# Patient Record
Sex: MALE | Race: WHITE | NOT HISPANIC OR LATINO | Employment: PART TIME | URBAN - METROPOLITAN AREA
[De-identification: names, ages, dates, MRNs, and addresses within clinical notes are randomized per-mention and may not be internally consistent; named-entity substitution may affect disease eponyms.]

---

## 2017-04-05 ENCOUNTER — HOSPITAL ENCOUNTER (EMERGENCY)
Facility: MEDICAL CENTER | Age: 42
End: 2017-04-05
Attending: EMERGENCY MEDICINE

## 2017-04-05 VITALS
RESPIRATION RATE: 18 BRPM | WEIGHT: 130 LBS | OXYGEN SATURATION: 98 % | DIASTOLIC BLOOD PRESSURE: 68 MMHG | SYSTOLIC BLOOD PRESSURE: 115 MMHG | HEART RATE: 88 BPM | BODY MASS INDEX: 19.7 KG/M2 | TEMPERATURE: 98.8 F | HEIGHT: 68 IN

## 2017-04-05 DIAGNOSIS — K02.9 DENTAL CARIES: ICD-10-CM

## 2017-04-05 PROCEDURE — 700102 HCHG RX REV CODE 250 W/ 637 OVERRIDE(OP): Performed by: EMERGENCY MEDICINE

## 2017-04-05 PROCEDURE — 99283 EMERGENCY DEPT VISIT LOW MDM: CPT

## 2017-04-05 PROCEDURE — A9270 NON-COVERED ITEM OR SERVICE: HCPCS | Performed by: EMERGENCY MEDICINE

## 2017-04-05 RX ORDER — AMOXICILLIN 500 MG/1
500 CAPSULE ORAL ONCE
Status: COMPLETED | OUTPATIENT
Start: 2017-04-05 | End: 2017-04-05

## 2017-04-05 RX ORDER — HYDROCODONE BITARTRATE AND ACETAMINOPHEN 5; 325 MG/1; MG/1
2 TABLET ORAL ONCE
Status: COMPLETED | OUTPATIENT
Start: 2017-04-05 | End: 2017-04-05

## 2017-04-05 RX ORDER — AMOXICILLIN 500 MG/1
500 CAPSULE ORAL 3 TIMES DAILY
Qty: 21 CAP | Refills: 0 | Status: SHIPPED | OUTPATIENT
Start: 2017-04-05 | End: 2017-04-12

## 2017-04-05 RX ORDER — HYDROCODONE BITARTRATE AND ACETAMINOPHEN 5; 325 MG/1; MG/1
1-2 TABLET ORAL EVERY 6 HOURS PRN
Qty: 10 TAB | Refills: 0 | Status: SHIPPED | OUTPATIENT
Start: 2017-04-05

## 2017-04-05 RX ADMIN — AMOXICILLIN 500 MG: 500 CAPSULE ORAL at 19:34

## 2017-04-05 RX ADMIN — HYDROCODONE BITARTRATE AND ACETAMINOPHEN 2 TABLET: 5; 325 TABLET ORAL at 19:34

## 2017-04-05 ASSESSMENT — PAIN SCALES - GENERAL: PAINLEVEL_OUTOF10: 8

## 2017-04-05 NOTE — ED AVS SNAPSHOT
Home Care Instructions                                                                                                                Jonathan Hooper   MRN: [id number]    Department:  [facility], Emergency Dept   Date of Visit:  4/5/2017            [facility], Emergency Dept    [address] [place] [id number]    Phone:  [phone]      You were seen by     Gadiel Gaitan M.D.      Your Diagnosis Was     Dental caries     K02.9       These are the medications you received during your hospitalization from 04/05/2017 1636 to 04/05/2017 1950     Date/Time Order Dose Route Action    04/05/2017 1934 hydrocodone-acetaminophen (NORCO) 5-325 MG per tablet 2 Tab 2 Tab Oral Given    04/05/2017 1934 amoxicillin (AMOXIL) capsule 500 mg 500 mg Oral Given      Follow-up Information     1. Follow up with [facility], Emergency Dept.    Specialty:  Emergency Medicine    Why:  If symptoms worsen    Contact information    [address] [place] [id number] [phone]      Medication Information     Review all of your home medications and newly ordered medications with your primary doctor and/or pharmacist as soon as possible. Follow medication instructions as directed by your doctor and/or pharmacist.     Please keep your complete medication list with you and share with your physician. Update the information when medications are discontinued, doses are changed, or new medications (including over-the-counter products) are added; and carry medication information at all times in the event of emergency situations.               Medication List      START taking these medications        Instructions    Morning Afternoon Evening Bedtime    amoxicillin 500 MG Caps   Last time this was given:  500 mg on 4/5/2017  7:34 PM   Commonly known as:  AMOXIL        Take 1 Cap by mouth 3 times a day for 7 days.   Dose:  500 mg                        hydrocodone-acetaminophen 5-325 MG Tabs per tablet   Last time this was given:  2 Tabs on 4/5/2017  7:34 PM   Commonly known as:  NORCO        Take 1-2 Tabs by mouth every 6 hours as needed.   Dose:  1-2 Tab                             Where to Get Your Medications      You can get these medications from any pharmacy     Bring a paper prescription for each of these medications    - amoxicillin 500 MG Caps  - hydrocodone-acetaminophen 5-325 MG Tabs per tablet              Discharge Instructions       Dental Caries  Dental caries is tooth decay. This decay can cause a hole in teeth (cavity) that can get bigger and deeper over time.  HOME CARE  · Brush and floss your teeth. Do this at least two times a day.  · Use a fluoride toothpaste.  · Use a mouth rinse if told by your dentist or doctor.  · Eat less sugary and starchy foods. Drink less sugary drinks.  · Avoid snacking often on sugary and starchy foods. Avoid sipping often on sugary drinks.  · Keep regular checkups and cleanings with your dentist.  · Use fluoride supplements if told by your dentist or doctor.  · Allow fluoride to be applied to teeth if told by your dentist or doctor.     This information is not intended to replace advice given to you by your health care provider. Make sure you discuss any questions you have with your health care provider.     Document Released: 09/26/2009 Document Revised: 01/08/2016 Document Reviewed: 12/20/2013  Elsevier Interactive Patient Education ©2016 [employer]            Patient Information     Patient Information    Following emergency treatment: all patient requiring follow-up care must return either to a private physician or a clinic if your condition worsens before you are able to obtain further medical attention, please return to the emergency room.     Billing Information    At [facility], we work to make the billing process streamlined for our patients.  Our Representatives are here to answer any questions you  may have regarding your hospital bill.  If you have insurance coverage and have supplied your insurance information to us, we will submit a claim to your insurer on your behalf.  Should you have any questions regarding your bill, we can be reached online or by phone as follows:  Online: You are able pay your bills online or live chat with our representatives about any billing questions you may have. We are here to help Monday - Friday from 8:00am to 7:30pm and 9:00am - 12:00pm on Saturdays.  Please visit [email or web address]  for more information.   Phone:  [phone] or [phone]    Please note that your emergency physician, surgeon, pathologist, radiologist, anesthesiologist, and other specialists are not employed by [facility] and will therefore bill separately for their services.  Please contact them directly for any questions concerning their bills at the numbers below:     Emergency Physician Services:  [phone]  [facility]:  [phone]  [facility]:  [phone]  [facility]:  [phone]    1. Your final bill may vary from the amount quoted upon discharge if all procedures are not complete at that time, or if your doctor has additional procedures of which we are not aware. You will receive an additional bill if you return to the Emergency Department at [facility] hospital for suture removal regardless of the facility of which the sutures were placed.     2. Please arrange for settlement of this account at the emergency registration.    3. All self-pay accounts are due in full at the time of treatment.  If you are unable to meet this obligation then payment is expected within 4-5 days.     4. If you have had radiology studies (CT, X-ray, Ultrasound, MRI), you have received a preliminary result during your emergency department visit. Please contact the radiology department [phone] to receive a copy of your final  result. Please discuss the Final result with your primary physician or with the follow up physician provided.     Crisis Hotline:  [place] Crisis Hotline:  3-398-GGOZCKJ or 1-510.513.6582  [place] Crisis Hotline:    1-166.379.8944 or 830-301-1075         ED Discharge Follow Up Questions    1. In order to provide you with very good care, we would like to follow up with a phone call in the next few days.  May we have your permission to contact you?     YES /  NO    2. What is the best phone number to call you? (       )_____-__________    3. What is the best time to call you?      Morning  /  Afternoon  /  Evening                   Patient Signature:  ____________________________________________________________    Date:  ____________________________________________________________

## 2017-04-05 NOTE — ED AVS SNAPSHOT
4/5/2017          Jonathan Hooper  [address]  [place] [address]    Dear Jonathan:    [facility] wants to ensure your discharge home is safe and you or your loved ones have had all your questions answered regarding your care after you leave the hospital.    You may receive a telephone call within two days of your discharge.  This call is to make certain you understand your discharge instructions as well as ensure we provided you with the best care possible during your stay with us.     The call will only last approximately 3-5 minutes and will be done by a nurse.    Once again, we want to ensure your discharge home is safe and that you have a clear understanding of any next steps in your care.  If you have any questions or concerns, please do not hesitate to contact us, we are here for you.  Thank you for choosing [facility] for your healthcare needs.    Sincerely,    Jean Guerin    [facility]

## 2017-04-05 NOTE — ED AVS SNAPSHOT
Chomp Access Code: [email or web address]  Expires: 5/5/2017  7:50 PM    Your email address is not on file at [facility].  Email Addresses are required for you to sign up for Chomp, please contact [phone] to verify your personal information and to provide your email address prior to attempting to register for Chomp.    Jonathan Hooper  [address]  [place] [address]    Chomp  A secure, online tool to manage your health information     [facility]’s Chomp® is a secure, online tool that connects you to your personalized health information from the privacy of your home -- day or night - making it very easy for you to manage your healthcare. Once the activation process is completed, you can even access your medical information using the Chomp gael, which is available for free in the Apple Gael store or Google Play store.     To learn more about Chomp, visit [email or web address]    There are two levels of access available (as shown below):   My Chart Features  [facility] Primary Care Doctor [facility]  Specialists [facility]  Urgent  Care Non-[facility] Primary Care Doctor   Email your healthcare team securely and privately 24/7 X X X    Manage appointments: schedule your next appointment; view details of past/upcoming appointments X      Request prescription refills. X      View recent personal medical records, including lab and immunizations X X X X   View health record, including health history, allergies, medications X X X X   Read reports about your outpatient visits, procedures, consult and ER notes X X X X   See your discharge summary, which is a recap of your hospital and/or ER visit that includes your diagnosis, lab results, and care plan X X  X     How to register for Crew[employer]:  Once your e-mail address has been verified, follow the following steps to sign up for Crewt.     1. Go to  [email or web address]  2. Click on the Sign Up Now box, which takes you to the New Member Sign Up  page. You will need to provide the following information:  a. Enter your [employer] Access Code exactly as it appears at the top of this page. (You will not need to use this code after you’ve completed the sign-up process. If you do not sign up before the expiration date, you must request a new code.)   b. Enter your date of birth.   c. Enter your home email address.   d. Click Submit, and follow the next screen’s instructions.  3. Create a Xenon Arc ID. This will be your [employer] Arc login ID and cannot be changed, so think of one that is secure and easy to remember.  4. Create a [employer] password. You can change your password at any time.  5. Enter your Password Reset Question and Answer. This can be used at a later time if you forget your password.   6. Enter your e-mail address. This allows you to receive e-mail notifications when new information is available in [employer] Arc.  7. Click Sign Up. You can now view your health information.    For assistance activating your [employer] account, call [phone]

## 2017-04-06 NOTE — ED NOTES
Pt given discharge and follow up instructions and prescriptions, all questions answered, instructed not to drive while taking narcotics, pt verbalized understanding. Pt discharged with family. Copy of discharge provided to pt. Signed copy in chart.  Pt states that all personal belongings are in possession.

## 2017-04-06 NOTE — DISCHARGE INSTRUCTIONS
Dental Caries  Dental caries is tooth decay. This decay can cause a hole in teeth (cavity) that can get bigger and deeper over time.  HOME CARE  · Brush and floss your teeth. Do this at least two times a day.  · Use a fluoride toothpaste.  · Use a mouth rinse if told by your dentist or doctor.  · Eat less sugary and starchy foods. Drink less sugary drinks.  · Avoid snacking often on sugary and starchy foods. Avoid sipping often on sugary drinks.  · Keep regular checkups and cleanings with your dentist.  · Use fluoride supplements if told by your dentist or doctor.  · Allow fluoride to be applied to teeth if told by your dentist or doctor.     This information is not intended to replace advice given to you by your health care provider. Make sure you discuss any questions you have with your health care provider.     Document Released: 09/26/2009 Document Revised: 01/08/2016 Document Reviewed: 12/20/2013  [employer] Interactive Patient Education ©2016 Elsevier Inc.

## 2017-04-06 NOTE — ED PROVIDER NOTES
ER Provider Note     Scribed for Gadiel Gaitan, * by Zulema Peters. 4/5/2017, 7:15 PM.    Primary Care Provider: Patient states none   Means of Arrival: Walk-in  History obtained from: Patient  History limited by: None      CHIEF COMPLAINT  Chief Complaint   Patient presents with   • Dental Pain     HPI  Jonathan Hooper is a 41 y.o. male who presents for dental pain located to his left bottom teeth that began two days ago and has gradually worsened since then that is exacerbated with eating. He states that he has an 'abscess' to the area that feels like it burst today. Per patient he has had this pain in the past most recently 1 year ago. The patient reports that he has not seen a dentist for this. He denies any recent fevers or chills.     REVIEW OF SYSTEMS  See HPI for further details.   E    PAST MEDICAL HISTORY  Past Medical History   Diagnosis Date   • Ulcerative colitis (CMS-HCC)    • Subdural hematoma (CMS-[facility])      FAMILY HISTORY  History reviewed. No pertinent family history.    SOCIAL HISTORY  Social History     Social History   • Marital Status: Single     Spouse Name: N/A   • Number of Children: N/A   • Years of Education: N/A     Social History Main Topics   • Smoking status: Current Every Day Smoker -- 1.00 packs/day for 23 years   • Smokeless tobacco: None   • Alcohol Use: Yes      Comment: 5-10drinks 1-2x/wk   • Drug Use: No   • Sexual Activity: Not Asked     Other Topics Concern   • None     Social History Narrative     SURGICAL HISTORY  History reviewed. No pertinent past surgical history.    CURRENT MEDICATIONS  Home Medications     Reviewed by Ester Grier R.N. (Registered Nurse) on 04/05/17 at 1837  Med List Status: Complete    Medication Last Dose Status          Patient Edy Taking any Medications                      ALLERGIES  No Known Allergies    PHYSICAL EXAM  VITAL SIGNS: /79 mmHg  Pulse 103  Temp(Src) 37.1 °C (98.8 °F) (Temporal)  Resp 20  Ht 1.727  "m (5' 7.99\")  Wt 58.968 kg (130 lb)  BMI 19.77 kg/m2  SpO2 98% @PHILLIP[552202::@   Constitutional: Well developed, Well nourished, No acute distress, Non-toxic appearance.   HENT: Normocephalic, Atraumatic, Bilateral external ears normal, on exam necrotic tooth, dental caries to both bottom molars on left, draining abscess noted above second molar on left side, easily milked out with minimal bleeding, facial swelling, no trismus  Eyes: PERRLA, EOMI, Conjunctiva normal, No discharge.   Neck: Normal range of motion, No tenderness, Supple, No stridor.   Pulmonary: no respiratory distress.   Lymphatic: No lymphadenopathy noted.   Skin: Warm, Dry, No erythema, No rash.   Musculoskeletal: Good range of motion in all major joints..     COURSE & MEDICAL DECISION MAKING  Pertinent Labs & Imaging studies reviewed. (See chart for details)    I have queried the patient in the prescription monitoring program, I do not see any evidence of prescription abuse, he has a score of 70.     7:18 PM-I am treating the patient with 2X 5-325 mg Norco PO, 500 mg Amoxil PO. Patient is given a prescription for norco and amoxicillin as well as a list for finding a dentist. They understand that we cannot fix their teeth and they need to find a dentist in order to fix the problem. The patient looks well. They're given verbal and written instructions on how to care for dental pain. They're discharged in satisfactory condition. He was advised to return to the emergency department for any worsening symptoms. The patient understands and agrees.     He is here for dental pain. Abscess that appears to be draining no signs of significant cellulitis at this time I think the patient to be safely discharged home. He understands to return of his increasing pain swelling or difficulty swallowing no signs of trismus no signs of any floor involvement.    I reviewed prescription monitoring program for patient's narcotic use before prescribing a scheduled " drug.The patient will not drink alcohol nor drive with prescribed medications. The patient will return for new or worsening symptoms and is stable at the time of discharge.    The patient is referred to a primary physician for blood pressure management, diabetic screening, and for all other preventative health concerns.    DISPOSITION:  Patient will be discharged home in stable condition.    FOLLOW UP:  [facility], Emergency Dept  [address] [address] [phone]    If symptoms worsen    OUTPATIENT MEDICATIONS:  New Prescriptions    AMOXICILLIN (AMOXIL) 500 MG CAP    Take 1 Cap by mouth 3 times a day for 7 days.    HYDROCODONE-ACETAMINOPHEN (NORCO) 5-325 MG TAB PER TABLET    Take 1-2 Tabs by mouth every 6 hours as needed.     FINAL IMPRESSION   1. Dental caries       Zulema WHEATLEY ([place]inton[place]ate), am scribing for, and in the presence of, Gadiel Gaitan, *.    Electronically signed by: Zulema Peters (Clintonibe), 4/5/2017    Gadiel WHEATLEY, * personally performed the services described in this documentation, as scribed by Zulema Peters in my presence, and it is both accurate and complete.    The note accurately reflects work and decisions made by me.  Gadiel Gaitan  4/6/2017  12:07 AM